# Patient Record
(demographics unavailable — no encounter records)

---

## 2025-01-13 NOTE — CARDIOLOGY SUMMARY
[de-identified] : NSR [de-identified] : TRANSTHORACIC ECHOCARDIOGRAM REPORT ________________________________________________________________________________                                     _______   Pt. Name:       ERIC GAYLE Study Date:    11/30/2024 MRN:            [id number]   YOB: 1972 Accession #:    [id number]   Age:           52 years Account#:       [id number]    Gender:        M Heart Rate:                 Height:        66.00 in (167.64 cm) Rhythm:                     Weight:        176.00 lb (79.83 kg) Blood Pressure: 160/94 mmHg BSA/BMI:       1.89 m / 28.41 kg/m ________________________________________________________________________________________ Referring Physician:    [id number] Stephen Schafer Interpreting Physician: Ani Morris MD Primary Sonographer:    Viktor BEAR  CPT:               ECHO TTE WO CON COMP W DOPP - 57838.m Indication(s):     Chest pain, unspecified - R07.9 Procedure:         Transthoracic echocardiogram with 2-D, M-mode and complete                    spectral and color flow Doppler. Ordering Location: [facility] [place] Admission Status:  Inpatient  _______________________________________________________________________________________  CONCLUSIONS:   1. Left ventricular systolic function is normal with an ejection fraction of 71 % by Pritchard's method of disks.  2. Normal right ventricular cavity size and normal right ventricular systolic function.  3. Left atrium is normal in size.  4. The right atrium is normal in size.  5. No significant valvular disease.  6. No pericardial effusion seen. [de-identified] : Mild CAD

## 2025-01-13 NOTE — ASSESSMENT
[FreeTextEntry1] : #Chest Pain - Pts chest pain is likely not related to CAD - He does have an area of bridging in the LAD, however, would not expect it to be causing his symptoms.  - Could potentially be GI or acid reflux related.   #HLD - Check lipid panel today - C/w statin therapy  #HTN - BP elevated today.  - Will increase dose of enalapril  - C/w imdur and metoprolol   #DM2 - Check A1C today

## 2025-07-17 NOTE — CARDIOLOGY SUMMARY
[de-identified] : TTE 11/30/2024: LVEF 71%, G1DD, normal RVSF. LA normal, RA normal. No valvular dx, no pericardial effusion.  [de-identified] : 12/2024: Mild atherosclerosis, mild mid LAD bridging. Medical therapy.

## 2025-07-17 NOTE — HISTORY OF PRESENT ILLNESS
[FreeTextEntry1] : This is a 52 y/o M with PMH of HTN, HLD, T2DM, BPH, and prior tobacco use (~2 ppd, quit 2022) who presents for initial cardiology visit with me 07/17/25. Patient last seen by Dr. Becky Drummond 01/13/2025 for chest pain. Of note, pt is [place] speaking,   07/17/25 Visit:  - Pt previously admitted to [facility] 11/2024 due to L sided chest pain, had x2w chest pain and dyspnea with rest and exertion. Occurs after walking 3 mins, has to rest 15-20 mins before resolving but also occurs waking him up from sleep. HsTn that visit 7 x2. CTAPE done 12/2024 negative for PE. TTE wnl. LHC. Found to be COVID positive during admission, febrile  Last Labs 01/2025:  - Pro-BNP <36, Hgb A1c 9.8%, Hgb 12.9(L), K 4, BUN 8, Cr 0.85, ALT 29, AST 46(H), Alk Phos 70, LDL 44, HDL 33(L), Tot Chol 100, Triglyc 136   A/P:    #Chest Pain: - TTE 11/2024 as above, LVEF 71%, G1DD, no other abnormalities - CTA PE neg 12/2024 - LHC 12/2024: mild mid LAD bridging, mild atherosclerosis in vessels - Pts chest pain is likely not related to CAD - He does have an area of bridging in the LAD, however, would not expect it to be causing his symptoms [] Could potentially be GI or acid reflux related [] F/u symptoms today  [] Cont ASA 81mg daily  #HTN: [] BP elevated 01/2025 visit  [] Continue enalapril 10mg [] Cont imdur 30mg daily [] Cont metoprolol succ 25mg daily  #HLD: - Lipid panel 01/2025: LDL 44, HDL 33(L), Tot Chol 100, Triglcy 136 [] C/w home rosuvastatin 10mg daily   #T2DM: - Hgb A1c 9.8% 01/2025 [] Cont insulin, glipizide 5mg daily, metformin 1g daily; mgmt as per PCP and outpt endo f/u